# Patient Record
Sex: MALE | Race: WHITE | NOT HISPANIC OR LATINO | Employment: FULL TIME | URBAN - METROPOLITAN AREA
[De-identification: names, ages, dates, MRNs, and addresses within clinical notes are randomized per-mention and may not be internally consistent; named-entity substitution may affect disease eponyms.]

---

## 2019-12-03 ENCOUNTER — OFFICE VISIT (OUTPATIENT)
Dept: URGENT CARE | Facility: CLINIC | Age: 34
End: 2019-12-03
Payer: COMMERCIAL

## 2019-12-03 VITALS
WEIGHT: 225 LBS | HEART RATE: 72 BPM | TEMPERATURE: 98 F | HEIGHT: 74 IN | BODY MASS INDEX: 28.88 KG/M2 | OXYGEN SATURATION: 98 % | DIASTOLIC BLOOD PRESSURE: 79 MMHG | RESPIRATION RATE: 18 BRPM | SYSTOLIC BLOOD PRESSURE: 142 MMHG

## 2019-12-03 DIAGNOSIS — R05.9 COUGH: ICD-10-CM

## 2019-12-03 DIAGNOSIS — B96.89 ACUTE BACTERIAL BRONCHITIS: Primary | ICD-10-CM

## 2019-12-03 DIAGNOSIS — J20.8 ACUTE BACTERIAL BRONCHITIS: Primary | ICD-10-CM

## 2019-12-03 DIAGNOSIS — R50.9 FEVER, UNSPECIFIED FEVER CAUSE: ICD-10-CM

## 2019-12-03 DIAGNOSIS — R06.2 WHEEZING: ICD-10-CM

## 2019-12-03 LAB
CTP QC/QA: YES
FLUAV AG NPH QL: NEGATIVE
FLUBV AG NPH QL: NEGATIVE

## 2019-12-03 PROCEDURE — 94640 PR INHAL RX, AIRWAY OBST/DX SPUTUM INDUCT: ICD-10-PCS | Mod: S$GLB,,, | Performed by: PHYSICIAN ASSISTANT

## 2019-12-03 PROCEDURE — 94640 AIRWAY INHALATION TREATMENT: CPT | Mod: S$GLB,,, | Performed by: PHYSICIAN ASSISTANT

## 2019-12-03 PROCEDURE — 99203 OFFICE O/P NEW LOW 30 MIN: CPT | Mod: 25,S$GLB,, | Performed by: PHYSICIAN ASSISTANT

## 2019-12-03 PROCEDURE — 99203 PR OFFICE/OUTPT VISIT, NEW, LEVL III, 30-44 MIN: ICD-10-PCS | Mod: 25,S$GLB,, | Performed by: PHYSICIAN ASSISTANT

## 2019-12-03 PROCEDURE — 87804 POCT INFLUENZA A/B: ICD-10-PCS | Mod: QW,S$GLB,, | Performed by: PHYSICIAN ASSISTANT

## 2019-12-03 PROCEDURE — 87804 INFLUENZA ASSAY W/OPTIC: CPT | Mod: QW,S$GLB,, | Performed by: PHYSICIAN ASSISTANT

## 2019-12-03 RX ORDER — PROMETHAZINE HYDROCHLORIDE AND DEXTROMETHORPHAN HYDROBROMIDE 6.25; 15 MG/5ML; MG/5ML
5 SYRUP ORAL EVERY 6 HOURS
Qty: 120 ML | Refills: 0 | Status: SHIPPED | OUTPATIENT
Start: 2019-12-03

## 2019-12-03 RX ORDER — AZITHROMYCIN 250 MG/1
TABLET, FILM COATED ORAL
Qty: 6 TABLET | Refills: 0 | Status: SHIPPED | OUTPATIENT
Start: 2019-12-03

## 2019-12-03 RX ORDER — BENZONATATE 100 MG/1
200 CAPSULE ORAL 3 TIMES DAILY PRN
Qty: 30 CAPSULE | Refills: 0 | Status: SHIPPED | OUTPATIENT
Start: 2019-12-03 | End: 2019-12-13

## 2019-12-03 RX ORDER — ALBUTEROL SULFATE 90 UG/1
2 AEROSOL, METERED RESPIRATORY (INHALATION) EVERY 6 HOURS PRN
Qty: 1 INHALER | Refills: 1 | Status: SHIPPED | OUTPATIENT
Start: 2019-12-03

## 2019-12-03 RX ORDER — PREDNISONE 20 MG/1
TABLET ORAL
Qty: 10 TABLET | Refills: 0 | Status: SHIPPED | OUTPATIENT
Start: 2019-12-03

## 2019-12-03 RX ORDER — ALBUTEROL SULFATE 0.83 MG/ML
2.5 SOLUTION RESPIRATORY (INHALATION)
Status: COMPLETED | OUTPATIENT
Start: 2019-12-03 | End: 2019-12-03

## 2019-12-03 RX ORDER — IPRATROPIUM BROMIDE 0.5 MG/2.5ML
0.5 SOLUTION RESPIRATORY (INHALATION)
Status: COMPLETED | OUTPATIENT
Start: 2019-12-03 | End: 2019-12-03

## 2019-12-03 RX ADMIN — IPRATROPIUM BROMIDE 0.5 MG: 0.5 SOLUTION RESPIRATORY (INHALATION) at 10:12

## 2019-12-03 RX ADMIN — ALBUTEROL SULFATE 2.5 MG: 0.83 SOLUTION RESPIRATORY (INHALATION) at 10:12

## 2019-12-03 NOTE — PATIENT INSTRUCTIONS
If you were prescribed a narcotic or controlled medication, do not drive or operate heavy equipment or machinery while taking these medications.  You must understand that you've received an Urgent Care treatment only and that you may be released before all your medical problems are known or treated. You, the patient, will arrange for follow up care as instructed.  Follow up with your PCP or specialty clinic as directed if not improved or as needed. You can call [phone] to schedule an appointment with the appropriate provider.  If your condition worsens we recommend that you receive another evaluation at the Emergency Department for any concerns or worsening of condition.  Patient aware and verbalized understanding.    You tested NEGATIVE for flu today.  Patient aware and verbalized understanding.    Tessalon Perles RX as prescribed for daytime cough.  Cough Syrup RX as prescribed for nighttime cough - will make you drowsy.  Azithromycin RX as prescribed for bronchitis.  Prednisone RX as prescribed to help reduce inflammation/swelling.  Increase fluids and rest is important.  Avoid contact with sick individuals.   Encouraged good hand-hygiene.  Humidifier use at home.  Take OTC Tylenol or Ibuprofen every 4-6 hours as needed for fever or pain.  Follow-up with your PCP for further evaluation as needed.  ER precautions given to patient.  Patient aware and verbalizes understanding.    Bronchitis with Wheezing (Viral or Bacterial: Adult)    Bronchitis is an infection of the air passages. It often occurs during a cold and is usually caused by a virus. Symptoms include cough with mucus (phlegm) and low-grade fever. This illness is contagious during the first few days and is spread through the air by coughing and sneezing, or by direct contact (touching the sick person and then touching your own eyes, nose, or mouth).  If there is a lot of inflammation, air flow is restricted. The air passages may also go into spasm,  especially if you have asthma. This causes wheezing and difficulty breathing even in people who do not have asthma.  Bronchitis usually lasts 7 to 14 days. The wheezing should improve with treatment during the first week. An inhaler is often prescribed to relax the air passages and stop wheezing. Antibiotics will be prescribed if your doctor thinks there is also a secondary bacterial infection.  Home care  · If symptoms are severe, rest at home for the first 2 to 3 days. When you go back to your usual activities, don't let yourself get too tired.  · Do not smoke. Also avoid being exposed to secondhand smoke.  · You may use over-the-counter medicine to control fever or pain, unless another medicine was prescribed. Note: If you have chronic liver or kidney disease or have ever had a stomach ulcer or gastrointestinal bleeding, talk with your healthcare provider before using these medicines. Also talk to your provider if you are taking medicine to prevent blood clots.) Aspirin should never be given to anyone younger than 18 years of age who is ill with a viral infection or fever. It may cause severe liver or brain damage.  · Your appetite may be poor, so a light diet is fine. Avoid dehydration by drinking 6 to 8 glasses of fluids per day (such as water, soft drinks, sports drinks, juices, tea, or soup). Extra fluids will help loosen secretions in the nose and lungs.  · Over-the-counter cough, cold, and sore-throat medicines will not shorten the length of the illness, but they may be helpful to reduce symptoms. (Note: Do not use decongestants if you have high blood pressure.)  · If you were given an inhaler, use it exactly as directed. If you need to use it more often than prescribed, your condition may be worsening. If this happens, contact your healthcare provider.  · If prescribed, finish all antibiotic medicine, even if you are feeling better after only a few days.  Follow-up care  Follow up with your healthcare  provider, or as advised. If you had an X-ray or ECG (electrocardiogram), a specialist will review it. You will be notified of any new findings that may affect your care.  Note: If you are age 65 or older, or if you have a chronic lung disease or condition that affects your immune system, or you smoke, talk to your healthcare provider about having a pneumococcal vaccinations and a yearly influenza vaccination (flu shot).  When to seek medical advice  Call your healthcare provider right away if any of these occur:  · Fever of 100.4°F (38°C) or higher  · Coughing up increasing amounts of colored sputum  · Weakness, drowsiness, headache, facial pain, ear pain, or a stiff neck  Call 911, or get immediate medical care  Contact emergency services right away if any of these occur.  · Coughing up blood  · Worsening weakness, drowsiness, headache, or stiff neck  · Increased wheezing not helped with medication, shortness of breath, or pain with breathing  Date Last Reviewed: 9/13/2015  © 2212-7831 [employer]. [address] [place] [address], [place] [address]. All rights reserved. This information is not intended as a substitute for professional medical care. Always follow your healthcare professional's instructions.

## 2019-12-03 NOTE — PROGRESS NOTES
"Subjective:       Patient ID: Darnell Torres is a 34 y.o. male.    Vitals:  height is 6' 2" (1.88 m) and weight is 102.1 kg (225 lb). His oral temperature is 98.1 °F (36.7 °C). His blood pressure is 142/79 (abnormal) and his pulse is 72. His respiration is 18 and oxygen saturation is 98%.     Chief Complaint: Cough    Patient with cough for 1-2 weeks. Patient has tried OTC without relief.    Cough   This is a new problem. The current episode started 1 to 4 weeks ago. The problem has been gradually worsening. The cough is productive of purulent sputum. Associated symptoms include chills, ear pain, a fever, headaches, myalgias, nasal congestion, postnasal drip and wheezing. Pertinent negatives include no chest pain, ear congestion, eye redness, heartburn, hemoptysis, rash, rhinorrhea, sore throat, shortness of breath, sweats or weight loss. Nothing aggravates the symptoms. He has tried OTC cough suppressant, cool air, body position changes and rest (otc tylenol/motrin, mucinex) for the symptoms. The treatment provided no relief. There is no history of asthma, bronchiectasis, bronchitis, COPD, emphysema, environmental allergies or pneumonia.       Constitution: Positive for chills, fatigue and fever. Negative for sweating.   HENT: Positive for ear pain, congestion, postnasal drip, sinus pain and sinus pressure. Negative for ear discharge, foreign body in ear, tinnitus, drooling, nosebleeds, foreign body in nose, sore throat, trouble swallowing and voice change.    Neck: Negative for neck pain, neck stiffness, painful lymph nodes and neck swelling.   Cardiovascular: Negative for chest pain, leg swelling, palpitations, sob on exertion and passing out.   Eyes: Negative for eye pain, eye redness, photophobia, double vision, blurred vision and eyelid swelling.   Respiratory: Positive for cough, sputum production and wheezing. Negative for chest tightness, bloody sputum, shortness of breath and stridor.    Gastrointestinal: " Negative for abdominal pain, abdominal bloating, nausea, vomiting, constipation, diarrhea and heartburn.   Musculoskeletal: Positive for muscle ache. Negative for joint pain, joint swelling, abnormal ROM of joint, back pain, pain with walking and muscle cramps.   Skin: Negative for rash and hives.   Allergic/Immunologic: Positive for sneezing. Negative for environmental allergies, hives and itching.   Neurological: Positive for headaches. Negative for dizziness, light-headedness, passing out, facial drooping, speech difficulty, loss of balance, altered mental status, loss of consciousness and seizures.   Hematologic/Lymphatic: Negative for swollen lymph nodes.   Psychiatric/Behavioral: Negative for altered mental status and nervous/anxious. The patient is not nervous/anxious.        Objective:      Physical Exam   Constitutional: He is oriented to person, place, and time. He appears well-developed and well-nourished. He is cooperative.  Non-toxic appearance. He does not have a sickly appearance. He does not appear ill. No distress.   HENT:   Head: Normocephalic and atraumatic.   Right Ear: Hearing, tympanic membrane, external ear and ear canal normal.   Left Ear: Hearing, tympanic membrane, external ear and ear canal normal.   Nose: Mucosal edema and rhinorrhea present. No nasal deformity. No epistaxis. Right sinus exhibits no maxillary sinus tenderness and no frontal sinus tenderness. Left sinus exhibits no maxillary sinus tenderness and no frontal sinus tenderness.   Mouth/Throat: Uvula is midline and mucous membranes are normal. No trismus in the jaw. Normal dentition. No uvula swelling. Posterior oropharyngeal erythema and cobblestoning present. No oropharyngeal exudate, posterior oropharyngeal edema or tonsillar abscesses. No tonsillar exudate.   Eyes: Conjunctivae and lids are normal. No scleral icterus.   Neck: Trachea normal, normal range of motion, full passive range of motion without pain and phonation  normal. Neck supple. No neck rigidity. No edema and no erythema present.   Cardiovascular: Normal rate, regular rhythm, normal heart sounds, intact distal pulses and normal pulses.   Pulmonary/Chest: Effort normal. No accessory muscle usage or stridor. No respiratory distress. He has decreased breath sounds. He has wheezes. He has no rhonchi. He has no rales.   Abdominal: Normal appearance.   Musculoskeletal: Normal range of motion. He exhibits no edema or deformity.   Lymphadenopathy:     He has no cervical adenopathy.   Neurological: He is alert and oriented to person, place, and time. He exhibits normal muscle tone. Coordination normal.   Skin: Skin is warm, dry, intact, not diaphoretic, not pale and no rash. Capillary refill takes less than 2 seconds.   Psychiatric: He has a normal mood and affect. His speech is normal and behavior is normal. Judgment and thought content normal. Cognition and memory are normal.   Nursing note and vitals reviewed.    Results for orders placed or performed in visit on 12/03/19   POCT Influenza A/B   Result Value Ref Range    Rapid Influenza A Ag Negative Negative    Rapid Influenza B Ag Negative Negative     Acceptable Yes      Post-breathing treatment: Patient reports feeling better. Re-evaluated patient and wheezing has improved on exam. Pulse Ox improved to 98%. Patient is stable, seated comfortably in NAD.        Assessment:       1. Acute bacterial bronchitis    2. Fever, unspecified fever cause    3. Cough    4. Wheezing        Plan:     Advised CXR in clinic today, but patient declined at this time. Will treat for bronchitis, but advised further evaluation with PCP and possible CXR if symptoms unimproved/worsen. ER precautions also given to patient. Patient aware, verbalized understanding and agreed with plan of care.    Acute bacterial bronchitis  -     azithromycin (Z-CARLOS) 250 MG tablet; Take 2 tablets by mouth on day 1; Take 1 tablet by mouth on days 2-5   Dispense: 6 tablet; Refill: 0  -     predniSONE (DELTASONE) 20 MG tablet; Take 40mg x2 days, 30 mg x2 days, 20mg x2 days, 10mg x2 days.  Dispense: 10 tablet; Refill: 0    Fever, unspecified fever cause  -     POCT Influenza A/B    Cough  -     Cancel: X-Ray Chest PA And Lateral; Future; Expected date: 12/03/2019  -     benzonatate (TESSALON PERLES) 100 MG capsule; Take 2 capsules (200 mg total) by mouth 3 (three) times daily as needed for Cough.  Dispense: 30 capsule; Refill: 0  -     promethazine-dextromethorphan (PROMETHAZINE-DM) 6.25-15 mg/5 mL Syrp; Take 5 mLs by mouth every 6 (six) hours.  Dispense: 120 mL; Refill: 0    Wheezing  -     ipratropium 0.02 % nebulizer solution 0.5 mg  -     albuterol nebulizer solution 2.5 mg  -     predniSONE (DELTASONE) 20 MG tablet; Take 40mg x2 days, 30 mg x2 days, 20mg x2 days, 10mg x2 days.  Dispense: 10 tablet; Refill: 0  -     albuterol (PROVENTIL/VENTOLIN HFA) 90 mcg/actuation inhaler; Inhale 2 puffs into the lungs every 6 (six) hours as needed for Wheezing. Rescue  Dispense: 1 Inhaler; Refill: 1      Patient Instructions   If you were prescribed a narcotic or controlled medication, do not drive or operate heavy equipment or machinery while taking these medications.  You must understand that you've received an Urgent Care treatment only and that you may be released before all your medical problems are known or treated. You, the patient, will arrange for follow up care as instructed.  Follow up with your PCP or specialty clinic as directed if not improved or as needed. You can call [phone] to schedule an appointment with the appropriate provider.  If your condition worsens we recommend that you receive another evaluation at the Emergency Department for any concerns or worsening of condition.  Patient aware and verbalized understanding.    You tested NEGATIVE for flu today.  Patient aware and verbalized understanding.    Tessalon Perles RX as prescribed for daytime  cough.  Cough Syrup RX as prescribed for nighttime cough - will make you drowsy.  Azithromycin RX as prescribed for bronchitis.  Prednisone RX as prescribed to help reduce inflammation/swelling.  Increase fluids and rest is important.  Avoid contact with sick individuals.   Encouraged good hand-hygiene.  Humidifier use at home.  Take OTC Tylenol or Ibuprofen every 4-6 hours as needed for fever or pain.  Follow-up with your PCP for further evaluation as needed.  ER precautions given to patient.  Patient aware and verbalizes understanding.    Bronchitis with Wheezing (Viral or Bacterial: Adult)    Bronchitis is an infection of the air passages. It often occurs during a cold and is usually caused by a virus. Symptoms include cough with mucus (phlegm) and low-grade fever. This illness is contagious during the first few days and is spread through the air by coughing and sneezing, or by direct contact (touching the sick person and then touching your own eyes, nose, or mouth).  If there is a lot of inflammation, air flow is restricted. The air passages may also go into spasm, especially if you have asthma. This causes wheezing and difficulty breathing even in people who do not have asthma.  Bronchitis usually lasts 7 to 14 days. The wheezing should improve with treatment during the first week. An inhaler is often prescribed to relax the air passages and stop wheezing. Antibiotics will be prescribed if your doctor thinks there is also a secondary bacterial infection.  Home care  · If symptoms are severe, rest at home for the first 2 to 3 days. When you go back to your usual activities, don't let yourself get too tired.  · Do not smoke. Also avoid being exposed to secondhand smoke.  · You may use over-the-counter medicine to control fever or pain, unless another medicine was prescribed. Note: If you have chronic liver or kidney disease or have ever had a stomach ulcer or gastrointestinal bleeding, talk with your healthcare  provider before using these medicines. Also talk to your provider if you are taking medicine to prevent blood clots.) Aspirin should never be given to anyone younger than 18 years of age who is ill with a viral infection or fever. It may cause severe liver or brain damage.  · Your appetite may be poor, so a light diet is fine. Avoid dehydration by drinking 6 to 8 glasses of fluids per day (such as water, soft drinks, sports drinks, juices, tea, or soup). Extra fluids will help loosen secretions in the nose and lungs.  · Over-the-counter cough, cold, and sore-throat medicines will not shorten the length of the illness, but they may be helpful to reduce symptoms. (Note: Do not use decongestants if you have high blood pressure.)  · If you were given an inhaler, use it exactly as directed. If you need to use it more often than prescribed, your condition may be worsening. If this happens, contact your healthcare provider.  · If prescribed, finish all antibiotic medicine, even if you are feeling better after only a few days.  Follow-up care  Follow up with your healthcare provider, or as advised. If you had an X-ray or ECG (electrocardiogram), a specialist will review it. You will be notified of any new findings that may affect your care.  Note: If you are age 65 or older, or if you have a chronic lung disease or condition that affects your immune system, or you smoke, talk to your healthcare provider about having a pneumococcal vaccinations and a yearly influenza vaccination (flu shot).  When to seek medical advice  Call your healthcare provider right away if any of these occur:  · Fever of 100.4°F (38°C) or higher  · Coughing up increasing amounts of colored sputum  · Weakness, drowsiness, headache, facial pain, ear pain, or a stiff neck  Call 911, or get immediate medical care  Contact emergency services right away if any of these occur.  · Coughing up blood  · Worsening weakness, drowsiness, headache, or stiff  neck  · Increased wheezing not helped with medication, shortness of breath, or pain with breathing  Date Last Reviewed: 9/13/2015  © 7995-2236 [employer]. [address] [place] [address], [place] [address]. All rights reserved. This information is not intended as a substitute for professional medical care. Always follow your healthcare professional's instructions.

## 2019-12-03 NOTE — LETTER
December 3, 2019      [facility] [address], [address]  [facility] [address]  Phone: [phone]  Fax: [phone]       Patient: Darnell Torres   YOB: 1985  Date of Visit: 12/03/2019    To Whom It May Concern:    Filipe Torres  was at [facility] on 12/03/2019. He may return to work/school on 12/6/2019 with no restrictions. If you have any questions or concerns, or if I can be of further assistance, please do not hesitate to contact me.    Sincerely,        Soraya Alston PA-C

## 2019-12-03 NOTE — LETTER
December 3, 2019      [facility] [address]  [facility] [address]  Phone: [phone]  Fax: [phone]       Patient: Darnell Torres   YOB: 1985  Date of Visit: 12/03/2019    To Whom It May Concern:    Filipe Torres  was at [facility] on 12/03/2019. He may return to work/school on 12/5/2019 with no restrictions. If you have any questions or concerns, or if I can be of further assistance, please do not hesitate to contact me.    Sincerely,        Soraya Alston PA-C

## 2020-02-11 ENCOUNTER — OFFICE VISIT (OUTPATIENT)
Dept: URGENT CARE | Facility: CLINIC | Age: 35
End: 2020-02-11
Payer: COMMERCIAL

## 2020-02-11 VITALS
BODY MASS INDEX: 30.54 KG/M2 | DIASTOLIC BLOOD PRESSURE: 95 MMHG | RESPIRATION RATE: 18 BRPM | WEIGHT: 238 LBS | HEIGHT: 74 IN | TEMPERATURE: 98 F | OXYGEN SATURATION: 98 % | SYSTOLIC BLOOD PRESSURE: 146 MMHG | HEART RATE: 64 BPM

## 2020-02-11 DIAGNOSIS — Z20.1 EXPOSURE TO TB: Primary | ICD-10-CM

## 2020-02-11 DIAGNOSIS — R52 BODY ACHES: ICD-10-CM

## 2020-02-11 LAB
CTP QC/QA: YES
FLUAV AG NPH QL: NEGATIVE
FLUBV AG NPH QL: NEGATIVE

## 2020-02-11 PROCEDURE — 87804 POCT INFLUENZA A/B: ICD-10-PCS | Mod: 59,QW,S$GLB, | Performed by: PHYSICIAN ASSISTANT

## 2020-02-11 PROCEDURE — 99214 OFFICE O/P EST MOD 30 MIN: CPT | Mod: 25,S$GLB,, | Performed by: PHYSICIAN ASSISTANT

## 2020-02-11 PROCEDURE — 71045 XR CHEST 1 VIEW: ICD-10-PCS | Mod: S$GLB,,, | Performed by: RADIOLOGY

## 2020-02-11 PROCEDURE — 71045 X-RAY EXAM CHEST 1 VIEW: CPT | Mod: S$GLB,,, | Performed by: RADIOLOGY

## 2020-02-11 PROCEDURE — 87804 INFLUENZA ASSAY W/OPTIC: CPT | Mod: QW,S$GLB,, | Performed by: PHYSICIAN ASSISTANT

## 2020-02-11 PROCEDURE — 99214 PR OFFICE/OUTPT VISIT, EST, LEVL IV, 30-39 MIN: ICD-10-PCS | Mod: 25,S$GLB,, | Performed by: PHYSICIAN ASSISTANT

## 2020-02-11 NOTE — PROGRESS NOTES
"Subjective:       Patient ID: Darnell Torres is a 34 y.o. male.    Vitals:  height is 6' 2" (1.88 m) and weight is 108 kg (238 lb). His temperature is 98.4 °F (36.9 °C). His blood pressure is 146/95 (abnormal) and his pulse is 64. His respiration is 18 and oxygen saturation is 98%.     Chief Complaint: Cough    Patient presents to urgent care with sinus congestion/pressure and body aches x 2 days. Patient also reports that he was exposed to someone with positive TB over the past month and is requesting CXR. Patient currently denies fever, chills, active CP, SOB, coughing up blood, night sweats, unintentional weight loss or fatigue.     Cough   This is a new problem. The current episode started yesterday. The problem has been gradually worsening. The problem occurs every few minutes. The cough is non-productive. Associated symptoms include myalgias, nasal congestion, postnasal drip and rhinorrhea. Pertinent negatives include no chest pain, chills, ear congestion, ear pain, eye redness, fever, headaches, heartburn, hemoptysis, rash, sore throat, shortness of breath, sweats, weight loss or wheezing. Nothing aggravates the symptoms. Treatments tried: OTC Mucinex and Sudafed. The treatment provided no relief. There is no history of asthma, bronchiectasis, bronchitis, COPD, emphysema, environmental allergies or pneumonia.       Constitution: Negative for activity change, appetite change, chills, sweating, fatigue and fever.   HENT: Positive for congestion, postnasal drip and sinus pressure. Negative for ear pain, drooling, nosebleeds, foreign body in nose, sinus pain, sore throat, trouble swallowing and voice change.    Neck: Negative for neck pain, neck stiffness and painful lymph nodes.   Cardiovascular: Negative for chest pain, leg swelling, palpitations, sob on exertion and passing out.   Eyes: Negative for eye discharge, eye itching, eye pain, eye redness and eyelid swelling.   Respiratory: Positive for cough. " Negative for chest tightness, sputum production, bloody sputum, shortness of breath, stridor and wheezing.    Gastrointestinal: Negative for abdominal pain, abdominal bloating, nausea, vomiting, constipation, diarrhea and heartburn.   Musculoskeletal: Positive for muscle ache. Negative for joint pain, joint swelling, abnormal ROM of joint, back pain, pain with walking and muscle cramps.   Skin: Negative for rash and hives.   Allergic/Immunologic: Negative for environmental allergies, seasonal allergies, hives, itching and sneezing.   Neurological: Negative for dizziness, light-headedness, passing out, loss of balance, headaches, altered mental status, loss of consciousness and seizures.   Hematologic/Lymphatic: Negative for swollen lymph nodes.   Psychiatric/Behavioral: Negative for altered mental status and nervous/anxious. The patient is not nervous/anxious.        Objective:      Physical Exam   Constitutional: He is oriented to person, place, and time. He appears well-developed and well-nourished. He is cooperative.  Non-toxic appearance. He does not have a sickly appearance. He does not appear ill. No distress.   Patient is stable, seated comfortably in NAD.   HENT:   Head: Normocephalic and atraumatic.   Right Ear: Hearing, tympanic membrane, external ear and ear canal normal.   Left Ear: Hearing, tympanic membrane, external ear and ear canal normal.   Nose: Mucosal edema and rhinorrhea present. No nasal deformity. No epistaxis. Right sinus exhibits no maxillary sinus tenderness and no frontal sinus tenderness. Left sinus exhibits no maxillary sinus tenderness and no frontal sinus tenderness.   Mouth/Throat: Uvula is midline and mucous membranes are normal. No trismus in the jaw. Normal dentition. No uvula swelling. Posterior oropharyngeal erythema and cobblestoning present. No oropharyngeal exudate, posterior oropharyngeal edema or tonsillar abscesses. No tonsillar exudate.   Eyes: Conjunctivae and lids are  normal. No scleral icterus.   Neck: Trachea normal, normal range of motion, full passive range of motion without pain and phonation normal. Neck supple. No neck rigidity. No edema and no erythema present.   Cardiovascular: Normal rate, regular rhythm, normal heart sounds, intact distal pulses and normal pulses.   Pulmonary/Chest: Effort normal and breath sounds normal. No accessory muscle usage or stridor. No respiratory distress. He has no decreased breath sounds. He has no wheezes. He has no rhonchi. He has no rales.   Abdominal: Normal appearance.   Musculoskeletal: Normal range of motion. He exhibits no edema or deformity.   Lymphadenopathy:     He has no cervical adenopathy.   Neurological: He is alert and oriented to person, place, and time. He exhibits normal muscle tone. Coordination normal.   Skin: Skin is warm, dry, intact, not diaphoretic, not pale and no rash. Capillary refill takes less than 2 seconds.   Psychiatric: He has a normal mood and affect. His speech is normal and behavior is normal. Judgment and thought content normal. Cognition and memory are normal.   Nursing note and vitals reviewed.        Results for orders placed or performed in visit on 02/11/20   POCT Influenza A/B   Result Value Ref Range    Rapid Influenza A Ag Negative Negative    Rapid Influenza B Ag Negative Negative     Acceptable Yes      Xr Chest 1 View  Result Date: 2/11/2020  EXAMINATION: XR CHEST 1 VIEW CLINICAL HISTORY: Contact with and (suspected) exposure to tuberculosis TECHNIQUE: Single frontal view of the chest was performed. COMPARISON: None FINDINGS: Mediastinal structures are midline. Cardiac silhouette and pulmonary vascular distribution are normal. Lung volumes are normal and symmetric. I detect no pulmonary disease, pleural fluid, lymph node enlargement, cardiac decompensation, pneumothorax, pneumomediastinum, pneumoperitoneum or significant osseous abnormality.   On the single PA chest radiograph  provided I detect no radiographic evidence of primary or post primary tuberculosis. Electronically signed by: Nanette Lorenz MD   Date:    02/11/2020   Time:    11:03      Assessment:       1. Exposure to TB    2. Body aches        Plan:         DON at [facility] informed that pt is high risk and will need CMP, CBC and initiate INH, after which he will f/u with [facility] clinic in [place]        Discussed CXR with patient. Offered patient Quantiferon Gold TB testing on today's visit, but patient refused. Educated patient at length on TB testing, but again, patient refused at this time. Advised further evaluation with PCP and/or ID as needed. ER precautions given to patient. Patient aware, verbalized understanding and agreed with plan of care.    Exposure to TB  -     XR CHEST 1 VIEW; Future; Expected date: 02/11/2020    Body aches  -     POCT Influenza A/B      Patient Instructions     If you were prescribed a narcotic or controlled medication, do not drive or operate heavy equipment or machinery while taking these medications.  You must understand that you've received an Urgent Care treatment only and that you may be released before all your medical problems are known or treated. You, the patient, will arrange for follow up care as instructed.  Follow up with your PCP or specialty clinic as directed if not improved or as needed. You can call [phone] to schedule an appointment with the appropriate provider.  If your condition worsens we recommend that you receive another evaluation at the Emergency Department for any concerns or worsening of condition.  Patient aware and verbalized understanding.    Discussed CXR and flu results with patient. Offered patient Quantiferon Gold TB testing on today's visit, but patient refused. Educated patient at length on TB testing, but again, patient refused at this time. Advised further evaluation with PCP and/or ID as needed. ER precautions given to patient.   Patient  aware, verbalized understanding and agreed with plan of care.    Your symptoms are viral in nature.  Increase fluids and rest is important.  Avoid contact with sick individuals.  Humidifier use at home.  OTC Claritin or Zyrtec daily as directed.  Flonase Nasal [place] as directed for nasal congestion.  OTC Tylenol or Motrin every 4 - 6 hours as needed for fever or pain.  Follow-up with your PCP in the next 48hrs or sooner for re-evaluation especially if no improvement in symptoms.  Follow-up in the ER for any worsening of symptoms such as new fever, increasing ear pain, neck stiffness, shortness of breath, etc.  Patient aware and verbalized understanding.    How to Prevent the Spread of Tuberculosis     TB disease is spread through the air in invisible droplets. Cover your mouth when you cough or sneeze.   Tuberculosis (TB) is a disease caused by a bacteria (germ). It is spread from person to person through the air. TB may scar the lungs and other parts of the body, such as the kidneys, bones, or the brain. TB can even be fatal. There are two forms of TB: latent TB infection and TB disease.  Latent TB infection (inactive)  Latent TB infection is also called inactive TB. If you have latent TB infection:  · Youve been exposed to TB. There are TB bacteria in your body, but you have no symptoms and are not sick. The only way to know if you have a TB infection is to have a skin test or a blood test for TB.  · You cant spread TB to others.  · You may develop TB disease (active TB) if you do not receive treatment or your immune system is weakened. This can happen if you are elderly or have HIV, for example.  TB disease (active)  TB disease is also called active TB. If you have TB disease:  · Symptoms include a lasting cough, fatigue, coughing up blood, chest pain, sweating at night, fever, loss of appetite, and weight loss.  · You CAN spread active TB to others. Your family, friends, and people you work with closely should  be tested.  · Take all medicine until its gone. TB disease can almost always be cured. But you may become sick again if you dont take all your medicine, even if you feel better.  Whos at risk  Anyone who has ever had contact with a person with active TB can get TB. Groups of people who are considered to be at high risk of TB include:  · People from countries with high rates of TB  · Residents and employees of long-term care facilities (such as nursing homes and prisons)  · People who dont get medical care (including the poor and the homeless)  · People who use illegal injected drugs  · People who have HIV infection or other medical risk factors (such as diabetes and end-stage renal disease)     Important:  If you think youre at high risk of TB or have been exposed to someone with the disease, get tested!   Date Last Reviewed: 11/1/2016  © 6698-7974 [employer]. [address] [place] [address], [place] [address]. All rights reserved. This information is not intended as a substitute for professional medical care. Always follow your healthcare professional's instructions.

## 2020-02-11 NOTE — LETTER
February 11, 2020      [facility] [address], [address]  [facility] [address]  Phone: [phone]  Fax: [phone]       Patient: Darnell Torres   YOB: 1985  Date of Visit: 02/11/2020    To Whom It May Concern:    Filipe Torres  was at [facility] on 02/11/2020. He may return to work on 02/12/2020 with no restrictions. If you have any questions or concerns, or if I can be of further assistance, please do not hesitate to contact me.    Sincerely,    Lata Woo MA

## 2020-02-11 NOTE — PATIENT INSTRUCTIONS
If you were prescribed a narcotic or controlled medication, do not drive or operate heavy equipment or machinery while taking these medications.  You must understand that you've received an Urgent Care treatment only and that you may be released before all your medical problems are known or treated. You, the patient, will arrange for follow up care as instructed.  Follow up with your PCP or specialty clinic as directed if not improved or as needed. You can call [phone] to schedule an appointment with the appropriate provider.  If your condition worsens we recommend that you receive another evaluation at the Emergency Department for any concerns or worsening of condition.  Patient aware and verbalized understanding.    Discussed CXR and flu results with patient. Offered patient Quantiferon Gold TB testing on today's visit, but patient refused. Educated patient at length on TB testing, but again, patient refused at this time. Advised further evaluation with PCP and/or ID as needed. ER precautions given to patient.   Patient aware, verbalized understanding and agreed with plan of care.    Your symptoms are viral in nature.  Increase fluids and rest is important.  Avoid contact with sick individuals.  Humidifier use at home.  OTC Claritin or Zyrtec daily as directed.  Flonase Nasal [place] as directed for nasal congestion.  OTC Tylenol or Motrin every 4 - 6 hours as needed for fever or pain.  Follow-up with your PCP in the next 48hrs or sooner for re-evaluation especially if no improvement in symptoms.  Follow-up in the ER for any worsening of symptoms such as new fever, increasing ear pain, neck stiffness, shortness of breath, etc.  Patient aware and verbalized understanding.    How to Prevent the Spread of Tuberculosis     TB disease is spread through the air in invisible droplets. Cover your mouth when you cough or sneeze.   Tuberculosis (TB) is a disease caused by a bacteria (germ). It is spread from person to  person through the air. TB may scar the lungs and other parts of the body, such as the kidneys, bones, or the brain. TB can even be fatal. There are two forms of TB: latent TB infection and TB disease.  Latent TB infection (inactive)  Latent TB infection is also called inactive TB. If you have latent TB infection:  · Youve been exposed to TB. There are TB bacteria in your body, but you have no symptoms and are not sick. The only way to know if you have a TB infection is to have a skin test or a blood test for TB.  · You cant spread TB to others.  · You may develop TB disease (active TB) if you do not receive treatment or your immune system is weakened. This can happen if you are elderly or have HIV, for example.  TB disease (active)  TB disease is also called active TB. If you have TB disease:  · Symptoms include a lasting cough, fatigue, coughing up blood, chest pain, sweating at night, fever, loss of appetite, and weight loss.  · You CAN spread active TB to others. Your family, friends, and people you work with closely should be tested.  · Take all medicine until its gone. TB disease can almost always be cured. But you may become sick again if you dont take all your medicine, even if you feel better.  Whos at risk  Anyone who has ever had contact with a person with active TB can get TB. Groups of people who are considered to be at high risk of TB include:  · People from countries with high rates of TB  · Residents and employees of long-term care facilities (such as nursing homes and prisons)  · People who dont get medical care (including the poor and the homeless)  · People who use illegal injected drugs  · People who have HIV infection or other medical risk factors (such as diabetes and end-stage renal disease)     Important:  If you think youre at high risk of TB or have been exposed to someone with the disease, get tested!   Date Last Reviewed: 11/1/2016  © 9835-8158 [employer]. [address]  [place] [address]. All rights reserved. This information is not intended as a substitute for professional medical care. Always follow your healthcare professional's instructions.

## 2020-03-05 ENCOUNTER — OFFICE VISIT (OUTPATIENT)
Dept: URGENT CARE | Facility: CLINIC | Age: 35
End: 2020-03-05
Payer: OTHER MISCELLANEOUS

## 2020-03-05 VITALS
HEIGHT: 74 IN | BODY MASS INDEX: 30.54 KG/M2 | WEIGHT: 238 LBS | SYSTOLIC BLOOD PRESSURE: 152 MMHG | HEART RATE: 76 BPM | OXYGEN SATURATION: 99 % | TEMPERATURE: 98 F | RESPIRATION RATE: 16 BRPM | DIASTOLIC BLOOD PRESSURE: 98 MMHG

## 2020-03-05 DIAGNOSIS — Z20.1 EXPOSURE TO TB: Primary | ICD-10-CM

## 2020-03-05 PROCEDURE — 99214 OFFICE O/P EST MOD 30 MIN: CPT | Mod: S$GLB,,, | Performed by: FAMILY MEDICINE

## 2020-03-05 PROCEDURE — 89240 UNLISTED MISC PATH TEST: CPT | Mod: S$GLB,,, | Performed by: FAMILY MEDICINE

## 2020-03-05 PROCEDURE — 99214 PR OFFICE/OUTPT VISIT, EST, LEVL IV, 30-39 MIN: ICD-10-PCS | Mod: S$GLB,,, | Performed by: FAMILY MEDICINE

## 2020-03-05 PROCEDURE — 89240 PANEL 1 (CMP & CBC W/DIFF): ICD-10-PCS | Mod: S$GLB,,, | Performed by: FAMILY MEDICINE

## 2020-03-05 RX ORDER — ISONIAZID 300 MG/1
300 TABLET ORAL DAILY
Qty: 30 TABLET | Refills: 0 | Status: SHIPPED | OUTPATIENT
Start: 2020-03-05

## 2020-03-05 NOTE — LETTER
[facility]  [address], [address]  [facility] [address]  Phone: [phone]  Fax: [phone]  [facility]: [phone]    Pt Name: Darnell Torres  Injury Date: 02/11/2020   Employee ID: [id number] Date of Treatment: 03/05/2020   [employer]      Appointment Time: 01:45 PM Arrived:    Provider: Ronak Lin MD Time Out:245     Office Treatment:   1. Exposure to TB      Medications Ordered This Encounter   Medications    isoniazid (NYDRAZID) 300 MG Tab   cbc, cmp drawn today   Patient Instructions: (f/u with TB clinic in [place] or [facility].)    Restrictions: Regular Duty     Return Appointment: f/u with TB clinic in [place] or [facility] at  or [facility]

## 2020-03-05 NOTE — PROGRESS NOTES
Subjective:       Patient ID: Darnell Torres is a 34 y.o. male.    Chief Complaint: No chief complaint on file.    Pt was exposed to TB at work.     ROS     Objective:      Physical Exam   Cardiovascular: Normal rate.   Pulmonary/Chest: Effort normal.   Nursing note and vitals reviewed.      Assessment:       1. Exposure to TB        Plan:     pt was considered high risk exposure by DON at [facility] . Will refer to TB clinic or [facility] pul for f/u cbc and cmp drawn on pt.     Medications Ordered This Encounter   Medications    isoniazid (NYDRAZID) 300 MG Tab     Sig: Take 1 tablet (300 mg total) by mouth once daily.     Dispense:  30 tablet     Refill:  0     Patient Instructions: (f/u with TB clinic in [place] or [facility].)   Restrictions: Regular Duty  No follow-ups on file.      Xr Chest 1 View    Result Date: 2/11/2020  EXAMINATION: XR CHEST 1 VIEW CLINICAL HISTORY: Contact with and (suspected) exposure to tuberculosis TECHNIQUE: Single frontal view of the chest was performed. COMPARISON: None FINDINGS: Mediastinal structures are midline. Cardiac silhouette and pulmonary vascular distribution are normal. Lung volumes are normal and symmetric. I detect no pulmonary disease, pleural fluid, lymph node enlargement, cardiac decompensation, pneumothorax, pneumomediastinum, pneumoperitoneum or significant osseous abnormality.     On the single PA chest radiograph provided I detect no radiographic evidence of primary or post primary tuberculosis. Electronically signed by: Nanette Lorenz MD Date:    02/11/2020 Time:    11:03

## 2020-03-07 LAB
ALBUMIN SERPL-MCNC: 5.1 G/DL (ref 4–5)
ALBUMIN/GLOB SERPL: 1.9 {RATIO} (ref 1.2–2.2)
ALP SERPL-CCNC: 44 IU/L (ref 39–117)
ALT SERPL-CCNC: 33 IU/L (ref 0–44)
AST SERPL-CCNC: 28 IU/L (ref 0–40)
BASOPHILS # BLD AUTO: 0 X10E3/UL (ref 0–0.2)
BASOPHILS NFR BLD AUTO: 0 %
BILIRUB SERPL-MCNC: 0.3 MG/DL (ref 0–1.2)
BUN SERPL-MCNC: 13 MG/DL (ref 6–20)
BUN/CREAT SERPL: 10 (ref 9–20)
CALCIUM SERPL-MCNC: 9.7 MG/DL (ref 8.7–10.2)
CHLORIDE SERPL-SCNC: 98 MMOL/L (ref 96–106)
CO2 SERPL-SCNC: 21 MMOL/L (ref 20–29)
CREAT SERPL-MCNC: 1.27 MG/DL (ref 0.76–1.27)
EOSINOPHIL # BLD AUTO: 0 X10E3/UL (ref 0–0.4)
EOSINOPHIL NFR BLD AUTO: 0 %
ERYTHROCYTE [DISTWIDTH] IN BLOOD BY AUTOMATED COUNT: 14 % (ref 11.6–15.4)
GLOBULIN SER CALC-MCNC: 2.7 G/DL (ref 1.5–4.5)
GLUCOSE SERPL-MCNC: 99 MG/DL (ref 65–99)
HCT VFR BLD AUTO: 43.6 % (ref 37.5–51)
HGB BLD-MCNC: 14.7 G/DL (ref 13–17.7)
IMM GRANULOCYTES # BLD AUTO: 0 X10E3/UL (ref 0–0.1)
IMM GRANULOCYTES NFR BLD AUTO: 0 %
LYMPHOCYTES # BLD AUTO: 1.4 X10E3/UL (ref 0.7–3.1)
LYMPHOCYTES NFR BLD AUTO: 20 %
MCH RBC QN AUTO: 30.1 PG (ref 26.6–33)
MCHC RBC AUTO-ENTMCNC: 33.7 G/DL (ref 31.5–35.7)
MCV RBC AUTO: 89 FL (ref 79–97)
MONOCYTES # BLD AUTO: 0.4 X10E3/UL (ref 0.1–0.9)
MONOCYTES NFR BLD AUTO: 6 %
NEUTROPHILS # BLD AUTO: 5.1 X10E3/UL (ref 1.4–7)
NEUTROPHILS NFR BLD AUTO: 74 %
PLATELET # BLD AUTO: 226 X10E3/UL (ref 150–450)
POTASSIUM SERPL-SCNC: 5 MMOL/L (ref 3.5–5.2)
PROT SERPL-MCNC: 7.8 G/DL (ref 6–8.5)
RBC # BLD AUTO: 4.88 X10E6/UL (ref 4.14–5.8)
SODIUM SERPL-SCNC: 138 MMOL/L (ref 134–144)
WBC # BLD AUTO: 7 X10E3/UL (ref 3.4–10.8)

## 2021-01-26 ENCOUNTER — CLINICAL SUPPORT (OUTPATIENT)
Dept: URGENT CARE | Facility: CLINIC | Age: 36
End: 2021-01-26
Payer: COMMERCIAL

## 2021-01-26 DIAGNOSIS — Z11.52 ENCOUNTER FOR SCREENING FOR COVID-19: Primary | ICD-10-CM

## 2021-01-26 LAB
CTP QC/QA: YES
SARS-COV-2 RDRP RESP QL NAA+PROBE: NEGATIVE

## 2021-01-26 PROCEDURE — U0002: ICD-10-PCS | Mod: QW,S$GLB,, | Performed by: FAMILY MEDICINE

## 2021-01-26 PROCEDURE — 99211 PR OFFICE/OUTPT VISIT, EST, LEVL I: ICD-10-PCS | Mod: S$GLB,,, | Performed by: FAMILY MEDICINE

## 2021-01-26 PROCEDURE — U0002 COVID-19 LAB TEST NON-CDC: HCPCS | Mod: QW,S$GLB,, | Performed by: FAMILY MEDICINE

## 2021-01-26 PROCEDURE — 99211 OFF/OP EST MAY X REQ PHY/QHP: CPT | Mod: S$GLB,,, | Performed by: FAMILY MEDICINE

## 2021-03-15 ENCOUNTER — OFFICE VISIT (OUTPATIENT)
Dept: URGENT CARE | Facility: CLINIC | Age: 36
End: 2021-03-15
Payer: COMMERCIAL

## 2021-03-15 VITALS
DIASTOLIC BLOOD PRESSURE: 81 MMHG | SYSTOLIC BLOOD PRESSURE: 132 MMHG | OXYGEN SATURATION: 97 % | BODY MASS INDEX: 28.88 KG/M2 | WEIGHT: 225 LBS | HEART RATE: 72 BPM | HEIGHT: 74 IN | TEMPERATURE: 98 F

## 2021-03-15 DIAGNOSIS — R11.2 NAUSEA AND VOMITING, INTRACTABILITY OF VOMITING NOT SPECIFIED, UNSPECIFIED VOMITING TYPE: Primary | ICD-10-CM

## 2021-03-15 DIAGNOSIS — R19.7 DIARRHEA, UNSPECIFIED TYPE: ICD-10-CM

## 2021-03-15 PROCEDURE — 3008F BODY MASS INDEX DOCD: CPT | Mod: CPTII,S$GLB,, | Performed by: FAMILY MEDICINE

## 2021-03-15 PROCEDURE — 99214 PR OFFICE/OUTPT VISIT, EST, LEVL IV, 30-39 MIN: ICD-10-PCS | Mod: S$GLB,,, | Performed by: FAMILY MEDICINE

## 2021-03-15 PROCEDURE — 3008F PR BODY MASS INDEX (BMI) DOCUMENTED: ICD-10-PCS | Mod: CPTII,S$GLB,, | Performed by: FAMILY MEDICINE

## 2021-03-15 PROCEDURE — 99214 OFFICE O/P EST MOD 30 MIN: CPT | Mod: S$GLB,,, | Performed by: FAMILY MEDICINE

## 2021-03-15 RX ORDER — LISDEXAMFETAMINE DIMESYLATE 40 MG/1
40 CAPSULE ORAL DAILY
COMMUNITY
Start: 2020-12-21

## 2021-03-15 RX ORDER — SUCRALFATE 1 G/1
1 TABLET ORAL 4 TIMES DAILY
Qty: 40 TABLET | Refills: 1 | Status: SHIPPED | OUTPATIENT
Start: 2021-03-15

## 2021-03-15 RX ORDER — ONDANSETRON 4 MG/1
4 TABLET, ORALLY DISINTEGRATING ORAL EVERY 8 HOURS PRN
Qty: 30 TABLET | Refills: 1 | Status: SHIPPED | OUTPATIENT
Start: 2021-03-15

## 2021-03-15 RX ORDER — SUCRALFATE 1 G/1
1 TABLET ORAL 4 TIMES DAILY
Qty: 40 TABLET | Refills: 1 | Status: SHIPPED | OUTPATIENT
Start: 2021-03-15 | End: 2021-03-15

## 2021-03-15 RX ORDER — ONDANSETRON 4 MG/1
4 TABLET, ORALLY DISINTEGRATING ORAL EVERY 8 HOURS PRN
Qty: 30 TABLET | Refills: 1 | Status: SHIPPED | OUTPATIENT
Start: 2021-03-15 | End: 2021-03-15

## 2021-03-15 RX ORDER — DICYCLOMINE HYDROCHLORIDE 10 MG/1
10 CAPSULE ORAL
Qty: 40 CAPSULE | Refills: 0 | Status: SHIPPED | OUTPATIENT
Start: 2021-03-15 | End: 2021-03-25

## 2021-03-15 RX ORDER — DICYCLOMINE HYDROCHLORIDE 10 MG/1
10 CAPSULE ORAL
Qty: 120 CAPSULE | Refills: 0 | Status: SHIPPED | OUTPATIENT
Start: 2021-03-15 | End: 2021-03-15